# Patient Record
Sex: MALE | Race: BLACK OR AFRICAN AMERICAN | NOT HISPANIC OR LATINO | Employment: FULL TIME | URBAN - METROPOLITAN AREA
[De-identification: names, ages, dates, MRNs, and addresses within clinical notes are randomized per-mention and may not be internally consistent; named-entity substitution may affect disease eponyms.]

---

## 2023-03-22 ENCOUNTER — HOSPITAL ENCOUNTER (EMERGENCY)
Facility: HOSPITAL | Age: 53
Discharge: HOME | End: 2023-03-23
Attending: EMERGENCY MEDICINE

## 2023-03-22 VITALS
TEMPERATURE: 98.8 F | OXYGEN SATURATION: 98 % | RESPIRATION RATE: 18 BRPM | HEART RATE: 94 BPM | DIASTOLIC BLOOD PRESSURE: 93 MMHG | SYSTOLIC BLOOD PRESSURE: 155 MMHG

## 2023-03-22 DIAGNOSIS — S01.81XA FACIAL LACERATION, INITIAL ENCOUNTER: ICD-10-CM

## 2023-03-22 DIAGNOSIS — S01.21XA COMPLEX LACERATION OF NOSE, INITIAL ENCOUNTER: Primary | ICD-10-CM

## 2023-03-22 RX ORDER — GINSENG 100 MG
1 CAPSULE ORAL ONCE
Status: COMPLETED | OUTPATIENT
Start: 2023-03-22 | End: 2023-03-22

## 2023-03-22 RX ORDER — LIDOCAINE HYDROCHLORIDE 10 MG/ML
10 INJECTION, SOLUTION EPIDURAL; INFILTRATION; INTRACAUDAL; PERINEURAL ONCE
Status: COMPLETED | OUTPATIENT
Start: 2023-03-22 | End: 2023-03-22

## 2023-03-22 RX ADMIN — LIDOCAINE HYDROCHLORIDE 10 ML: 10 INJECTION, SOLUTION EPIDURAL; INFILTRATION; INTRACAUDAL; PERINEURAL at 22:48

## 2023-03-22 RX ADMIN — BACITRACIN ZINC 1 SMALL APPLICATION: 500 OINTMENT TOPICAL at 22:48

## 2023-03-22 RX ADMIN — TETANUS TOXOID, REDUCED DIPHTHERIA TOXOID AND ACELLULAR PERTUSSIS VACCINE, ADSORBED 0.5 ML: 5; 2.5; 8; 8; 2.5 SUSPENSION INTRAMUSCULAR at 22:58

## 2023-03-22 NOTE — Clinical Note
Nancy Rodriges was seen and treated in our emergency department on 3/22/2023  Diagnosis:     Chastity Cruz  is off the rest of the shift today, may return to work on return date  He may return on this date: 03/27/2023         If you have any questions or concerns, please don't hesitate to call        Jessy Rodriguez MD    ______________________________           _______________          _______________  Hospital Representative                              Date                                Time

## 2023-03-23 ENCOUNTER — APPOINTMENT (EMERGENCY)
Dept: RADIOLOGY | Facility: HOSPITAL | Age: 53
End: 2023-03-23

## 2023-03-23 RX ORDER — GINSENG 100 MG
1 CAPSULE ORAL 2 TIMES DAILY
Qty: 28 G | Refills: 0 | Status: SHIPPED | OUTPATIENT
Start: 2023-03-23

## 2023-03-23 RX ORDER — CEPHALEXIN 500 MG/1
500 CAPSULE ORAL EVERY 8 HOURS SCHEDULED
Qty: 15 CAPSULE | Refills: 0 | Status: SHIPPED | OUTPATIENT
Start: 2023-03-23 | End: 2023-03-28

## 2023-03-23 NOTE — ED ATTENDING ATTESTATION
3/22/2023  I, Jenn Foreman MD, saw and evaluated the patient  I have discussed the patient with the resident/non-physician practitioner and agree with the resident's/non-physician practitioner's findings, Plan of Care, and MDM as documented in the resident's/non-physician practitioner's note, except where noted  All available labs and Radiology studies were reviewed  I was present for key portions of any procedure(s) performed by the resident/non-physician practitioner and I was immediately available to provide assistance  At this point I agree with the current assessment done in the Emergency Department  I have conducted an independent evaluation of this patient a history and physical is as follows:    ED Course         Critical Care Time  Procedures    51 yo male while unloading boxes, fell on face and cut his nose and hit head and right hand  Pt with right thumb pain and nasal laceration  No blood thinners  No loc  Vss, afebrile, lungs cta, rrr, right thumb tenderness, nvi  Nasal laceration v shaped, 4 cm  Tetanus, suture, xray hand/thumb

## 2023-03-23 NOTE — ED PROVIDER NOTES
History  Chief Complaint   Patient presents with   • Facial Injury     Pt at work opened a door at work and had a pallet fall and hit him in the face; has bleeding and lacerations to nose  (-) LOC  C/o pain in nose, head, and R thumb     15-year-old male past medical history of hypertension presents to the ED with abrasion to the nose and pain in the right hand following very at work  Patient was unloading a truck full of goods when a large TV box closed with zip ties fell from height hitting patient the right side of his head nose he tried to check the TV with his right hand  Patient immediately felt blood dripping from his nose  He did not lose consciousness, does not complain of significant headache, no effusion or blurred vision  Injury has not happened over 2 hours prior to my examination of the patient  Is not on any blood thinners, no aspirin  Denies pain anywhere else, of systems otherwise negative  None       Past Medical History:   Diagnosis Date   • Hypertension        History reviewed  No pertinent surgical history  History reviewed  No pertinent family history  I have reviewed and agree with the history as documented  E-Cigarette/Vaping     E-Cigarette/Vaping Substances     Social History     Tobacco Use   • Smoking status: Never   • Smokeless tobacco: Never   Substance Use Topics   • Alcohol use: Yes     Comment: social   • Drug use: Not Currently        Review of Systems   Constitutional: Negative for chills and fever  HENT: Negative for ear pain and sore throat  Eyes: Negative for pain and visual disturbance  Respiratory: Negative for cough and shortness of breath  Cardiovascular: Negative for chest pain and palpitations  Gastrointestinal: Negative for abdominal pain and vomiting  Genitourinary: Negative for dysuria and hematuria  Musculoskeletal: Negative for arthralgias and back pain  Skin: Positive for wound  Negative for color change and rash  Neurological: Negative for seizures and syncope  All other systems reviewed and are negative  Physical Exam  ED Triage Vitals [03/22/23 2129]   Temperature Pulse Respirations Blood Pressure SpO2   98 8 °F (37 1 °C) 94 18 155/93 98 %      Temp src Heart Rate Source Patient Position - Orthostatic VS BP Location FiO2 (%)   -- -- -- -- --      Pain Score       6             Orthostatic Vital Signs  Vitals:    03/22/23 2129   BP: 155/93   Pulse: 94       Physical Exam  Vitals and nursing note reviewed  Constitutional:       General: He is not in acute distress  Appearance: He is well-developed  HENT:      Head: Normocephalic and atraumatic  Comments: Located 4 cm laceration present to the nose  Nose:      Comments: Deviated nasal septum to the L  No septal swelling or hematoma  No tenderness to palpation of the forehead, maxilla or zygoma  Eyes:      Conjunctiva/sclera: Conjunctivae normal    Cardiovascular:      Rate and Rhythm: Normal rate and regular rhythm  Heart sounds: No murmur heard  Pulmonary:      Effort: Pulmonary effort is normal  No respiratory distress  Breath sounds: Normal breath sounds  Abdominal:      Palpations: Abdomen is soft  Tenderness: There is no abdominal tenderness  Musculoskeletal:         General: No swelling  Hands:       Cervical back: Neck supple  Comments: Mild tenderness to palpation of the soft tissue of the first digit on the right  No snuffbox tenderness strength 5 out of 5 patient able to make okay sign with first and second digit  Sensation intact  Skin:     General: Skin is warm and dry  Capillary Refill: Capillary refill takes less than 2 seconds  Neurological:      Mental Status: He is alert     Psychiatric:         Mood and Affect: Mood normal          ED Medications  Medications   lidocaine (PF) (XYLOCAINE-MPF) 1 % injection 10 mL (10 mL Infiltration Given by Other 3/22/23 0116) tetanus-diphtheria-acellular pertussis (BOOSTRIX) IM injection 0 5 mL (0 5 mL Intramuscular Given 3/22/23 2258)   bacitracin topical ointment 1 small application (1 small application Topical Given 3/22/23 2248)       Diagnostic Studies  Results Reviewed     None                 No orders to display         Procedures  Laceration repair    Date/Time: 3/23/2023 2:04 AM  Performed by: Domingo Garland MD  Authorized by: Domingo Garland MD   Consent: Verbal consent obtained  Consent given by: patient  Patient identity confirmed: verbally with patient  Body area: head/neck  Location details: nose  Laceration length: 4 cm  Foreign bodies: no foreign bodies  Tendon involvement: none  Nerve involvement: none  Vascular damage: no  Anesthesia: local infiltration    Anesthesia:  Local Anesthetic: lidocaine 1% without epinephrine    Wound Dehiscence:    Secondary closure or dehiscence: complex    Procedure Details:  Irrigation solution: saline  Irrigation method: tap  Amount of cleaning: extensive  Debridement: none  Degree of undermining: none  Skin closure: 6-0 nylon  Number of sutures: 11  Technique: simple  Approximation: loose  Approximation difficulty: complex  Dressing: antibiotic ointment and 4x4 sterile gauze            ED Course                             SBIRT 20yo+    Flowsheet Row Most Recent Value   SBIRT (23 yo +)    In order to provide better care to our patients, we are screening all of our patients for alcohol and drug use  Would it be okay to ask you these screening questions? Unable to answer at this time Filed at: 03/22/2023 8055                Medical Decision Making  47-year-old male past medical history of hypertension presents to the ED with abrasion to the nose and pain in the right hand following very at work  DDx: nasal laceration, nasal fracture, hand soft tissue injury vs fracture, unlikely intracranial bleed/fracture  Complex nasal laceration washed out and repaired loosely   Ambulatory referral placed for both ENT and plastic surgery for definitive evaluation and repair  Laceration dressed with bacitracin, xeroform and sterile gauze  Extra supplies provided for re-dressing  Prophylactic keflex prescribed for preventions of infection  Discussed return precautions to the ED for signs of infection  Hand Xrays ordered, however patient declining stating pain is minimal and he does not believe anything is fractured  Discussed we will not know without films however he again declines  No needs for CT head or facial bones  No tenderness to palpation of the facial bones  Neuro exam non-focal, no headache or neck pain  Patient discharged home in stable condition to follow-up with ENT and plastics  Risk  OTC drugs  Prescription drug management  Disposition  Final diagnoses:   Facial laceration, initial encounter   Complex laceration of nose, initial encounter     Time reflects when diagnosis was documented in both MDM as applicable and the Disposition within this note     Time User Action Codes Description Comment    3/23/2023 12:03 AM Christopher Pisano Add [S01 81XA] Facial laceration, initial encounter     3/23/2023 12:03 AM Christopher Pisano Add [S01 21XA] Complex laceration of nose, initial encounter     3/23/2023 12:03 AM Christopher Pisano Modify [S01 81XA] Facial laceration, initial encounter     3/23/2023 12:03 AM Christopher Pisano Modify [S01 21XA] Complex laceration of nose, initial encounter       ED Disposition     ED Disposition   Discharge    Condition   Stable    Date/Time   Th Mar 23, 2023 12:05 AM    Comment   Liliya Salazar discharge to home/self care                 Follow-up Information     Follow up With Specialties Details Why Contact Info Additional Information    [facility]   [id number] [place] [id number]  [address] [place] [address], [place], [place], [address]    [facility][note 3] ENT Elaine Viramontes Otolaryngology   [address]  [place] [address]  [phone] [facility][note 3] ENT Lexylaura Genovevashira [address], [address] [place], [place], [address], [phone]          Discharge Medication List as of 3/23/2023 12:05 AM      START taking these medications    Details   bacitracin topical ointment 500 units/g topical ointment Apply 1 large application topically 2 (two) times a day, Starting Thu 3/23/2023, Normal      cephalexin (KEFLEX) 500 mg capsule Take 1 capsule (500 mg total) by mouth every 8 (eight) hours for 5 days, Starting Thu 3/23/2023, Until Tue 3/28/2023, Normal               PDMP Review     None           ED Provider  Attending physically available and evaluated Kristinaandrewtaylor Krishnan I managed the patient along with the ED Attending      Electronically Signed by         Kristina Kang MD  03/23/23 5493